# Patient Record
Sex: FEMALE | Race: BLACK OR AFRICAN AMERICAN | NOT HISPANIC OR LATINO | Employment: STUDENT | URBAN - METROPOLITAN AREA
[De-identification: names, ages, dates, MRNs, and addresses within clinical notes are randomized per-mention and may not be internally consistent; named-entity substitution may affect disease eponyms.]

---

## 2017-01-18 ENCOUNTER — TELEPHONE (OUTPATIENT)
Dept: OBSTETRICS AND GYNECOLOGY | Facility: CLINIC | Age: 22
End: 2017-01-18

## 2017-01-18 NOTE — TELEPHONE ENCOUNTER
Will begin the process, LMOVM to have the patient call the office. Will verify the patient still has the same insurance

## 2017-01-18 NOTE — TELEPHONE ENCOUNTER
----- Message from Sol Benites sent at 1/18/2017  9:47 AM CST -----  Contact: Pt  X_  1st Request  _  2nd Request  _  3rd Request        Who: SHAW,SAKILE CORINNE [[id number]]    Why: Patient would like to schedule her procedure (IUD insertion)    What Number to Call Back: [phone]    When to Expect a call back: (Before the end of the day)   -- if call after 3:00 call back will be tomorrow.    BLANKA

## 2017-01-18 NOTE — TELEPHONE ENCOUNTER
----- Message from Ryanne Hernandez sent at 1/18/2017  3:16 PM CST -----  Contact: self  Patient would like to start the process on getting the Annabel IUD. Patient states the was previously talked about in her annual visit in October. Patient can be reached at [phone].

## 2017-04-12 ENCOUNTER — TELEPHONE (OUTPATIENT)
Dept: OBSTETRICS AND GYNECOLOGY | Facility: CLINIC | Age: 22
End: 2017-04-12

## 2017-04-12 NOTE — TELEPHONE ENCOUNTER
----- Message from Janiya Mckeon sent at 4/12/2017  1:57 PM CDT -----  Contact: Self  X  1st Request  _  2nd Request  _  3rd Request        Who:  SHAW,SAKILE CORINNE [[id number]]    Why: Pt is calling to discuss having her implant approved and scheduled.  Please contact pt to further discuss and advise.    What Number to Call Back:  [phone]    When to Expect a call back: (Before the end of the day)   -- if the call is after 12:00, the call back will be tomorrow.

## 2017-05-17 ENCOUNTER — OFFICE VISIT (OUTPATIENT)
Dept: OBSTETRICS AND GYNECOLOGY | Facility: CLINIC | Age: 22
End: 2017-05-17
Attending: OBSTETRICS & GYNECOLOGY
Payer: COMMERCIAL

## 2017-05-17 VITALS
WEIGHT: 150.56 LBS | DIASTOLIC BLOOD PRESSURE: 76 MMHG | HEIGHT: 62 IN | BODY MASS INDEX: 27.7 KG/M2 | SYSTOLIC BLOOD PRESSURE: 118 MMHG

## 2017-05-17 DIAGNOSIS — Z01.419 WELL WOMAN EXAM WITH ROUTINE GYNECOLOGICAL EXAM: Primary | ICD-10-CM

## 2017-05-17 DIAGNOSIS — Z12.4 PAP SMEAR FOR CERVICAL CANCER SCREENING: ICD-10-CM

## 2017-05-17 DIAGNOSIS — Z30.014 ENCOUNTER FOR INITIAL PRESCRIPTION OF INTRAUTERINE CONTRACEPTIVE DEVICE (IUD): ICD-10-CM

## 2017-05-17 PROCEDURE — 87591 N.GONORRHOEAE DNA AMP PROB: CPT

## 2017-05-17 PROCEDURE — 99999 PR PBB SHADOW E&M-EST. PATIENT-LVL II: CPT | Mod: PBBFAC,,, | Performed by: OBSTETRICS & GYNECOLOGY

## 2017-05-17 PROCEDURE — 88175 CYTOPATH C/V AUTO FLUID REDO: CPT

## 2017-05-17 PROCEDURE — 99395 PREV VISIT EST AGE 18-39: CPT | Mod: S$GLB,,, | Performed by: OBSTETRICS & GYNECOLOGY

## 2017-05-17 NOTE — PROGRESS NOTES
Sakile Corinne Shaw is a 22 y.o. female  who presents for annual exam.  Patient's last menstrual period was 2017 (approximate).  Menses occur monthly without intermenstrual bleeding.  Reports having LLQ pain last week that has now completely resolved.  She was treated last year for Gonorrhea with negative JAN.  She desires the Annabel IUD.    UPT today: Negative     Past Medical History:   Diagnosis Date    Anxiety     Bipolar 1 disorder     Depression     Environmental allergies     Hayfever        Past Surgical History:   Procedure Laterality Date    WISDOM TOOTH EXTRACTION         OB History      Para Term  AB TAB SAB Ectopic Multiple Living    0 0 0 0 0 0 0 0 0 0          ROS:  GENERAL: Feeling well overall.   SKIN: Denies rash or lesions.   HEAD: Denies head injury or headache.   NODES: Denies enlarged lymph nodes.   CHEST: Denies chest pain or shortness of breath.   CARDIOVASCULAR: Denies palpitations or left sided chest pain.   ABDOMEN: No abdominal pain, nausea, vomiting or rectal bleeding.   URINARY: No dysuria or hematuria.  REPRODUCTIVE: See HPI.   BREASTS: Denies pain, lumps, or nipple discharge.   HEMATOLOGIC: No easy bruisability or excessive bleeding.   MUSCULOSKELETAL: Denies joint pain or swelling.   NEUROLOGIC: Denies syncope or weakness.   PSYCHIATRIC: Denies depression.    PE:   (chaperone present during entire exam)  APPEARANCE: Well nourished, well developed, in no acute distress.  BREASTS: Symmetrical, no skin changes or visible lesions. No palpable masses, nipple discharge or adenopathy bilaterally.  ABDOMEN: Soft. No tenderness or masses. No hernias. No CVA tenderness.  VULVA: No lesions. Normal female genitalia.  URETHRAL MEATUS: Normal size and location, no lesions, no prolapse.  URETHRA: No masses, tenderness, prolapse or scarring.  VAGINA: Moist and well rugated, mild amount of menstrual blood in vault.  CERVIX: No lesions and discharge. No CMT.  PAP  done.  UTERUS: Normal size, regular shape, mobile, non-tender, bladder base nontender.  ADNEXA: No masses, tenderness or CDS nodularity.  ANUS PERINEUM: Normal.      Diagnosis:  1. Well woman exam with routine gynecological exam    2. Pap smear for cervical cancer screening    3. Encounter for initial prescription of intrauterine contraceptive device (IUD)          PLAN:    Orders Placed This Encounter    C. trachomatis/N. gonorrhoeae by AMP DNA Urine    Liquid-based pap smear, screening       Patient was counseled today on the need for annual gyn exams.  On exam today, she is completely asymptomatic without any pelvic pain.  We discussed her desire for Annabel IUD which has been ordered.  We will check GC/CT prior to inserting the Annabel.  Return for IUD insertion.    Return with period for Annabel insertion   Follow-up in 1 year for annual exam

## 2017-05-18 ENCOUNTER — PATIENT MESSAGE (OUTPATIENT)
Dept: OBSTETRICS AND GYNECOLOGY | Facility: CLINIC | Age: 22
End: 2017-05-18

## 2017-05-18 LAB
C TRACH DNA SPEC QL NAA+PROBE: NOT DETECTED
N GONORRHOEA DNA SPEC QL NAA+PROBE: NOT DETECTED

## 2017-05-23 ENCOUNTER — PATIENT MESSAGE (OUTPATIENT)
Dept: OBSTETRICS AND GYNECOLOGY | Facility: CLINIC | Age: 22
End: 2017-05-23

## 2017-06-16 ENCOUNTER — TELEPHONE (OUTPATIENT)
Dept: OBSTETRICS AND GYNECOLOGY | Facility: CLINIC | Age: 22
End: 2017-06-16

## 2017-06-16 NOTE — TELEPHONE ENCOUNTER
----- Message from Jolie Hurst sent at 6/16/2017  4:23 PM CDT -----  Contact: Self  Pt is requesting to get the Annabel if possible. The pt can be reached at [phone]. Thanks kG